# Patient Record
Sex: MALE | Race: WHITE | HISPANIC OR LATINO | Employment: FULL TIME | URBAN - METROPOLITAN AREA
[De-identification: names, ages, dates, MRNs, and addresses within clinical notes are randomized per-mention and may not be internally consistent; named-entity substitution may affect disease eponyms.]

---

## 2021-07-28 ENCOUNTER — NON-PROVIDER VISIT (OUTPATIENT)
Dept: URGENT CARE | Facility: PHYSICIAN GROUP | Age: 51
End: 2021-07-28

## 2021-07-28 DIAGNOSIS — Z02.1 PRE-EMPLOYMENT DRUG SCREENING: ICD-10-CM

## 2021-07-28 LAB
AMP AMPHETAMINE: NORMAL
COC COCAINE: NORMAL
INT CON NEG: NEGATIVE
INT CON POS: POSITIVE
MET METHAMPHETAMINES: NORMAL
OPI OPIATES: NORMAL
PCP PHENCYCLIDINE: NORMAL
POC DRUG COMMENT 753798-OCCUPATIONAL HEALTH: NORMAL
THC: NORMAL

## 2021-07-28 PROCEDURE — 80305 DRUG TEST PRSMV DIR OPT OBS: CPT | Performed by: PHYSICIAN ASSISTANT

## 2022-02-02 ENCOUNTER — HOSPITAL ENCOUNTER (EMERGENCY)
Facility: MEDICAL CENTER | Age: 52
End: 2022-02-02
Attending: EMERGENCY MEDICINE
Payer: COMMERCIAL

## 2022-02-02 VITALS
TEMPERATURE: 98.6 F | OXYGEN SATURATION: 98 % | SYSTOLIC BLOOD PRESSURE: 119 MMHG | BODY MASS INDEX: 28.18 KG/M2 | RESPIRATION RATE: 16 BRPM | HEIGHT: 69 IN | HEART RATE: 98 BPM | DIASTOLIC BLOOD PRESSURE: 82 MMHG | WEIGHT: 190.26 LBS

## 2022-02-02 DIAGNOSIS — U07.1 COVID: ICD-10-CM

## 2022-02-02 LAB
FLUAV RNA SPEC QL NAA+PROBE: NEGATIVE
FLUBV RNA SPEC QL NAA+PROBE: NEGATIVE
RSV RNA SPEC QL NAA+PROBE: NEGATIVE
S PYO DNA SPEC NAA+PROBE: NOT DETECTED
SARS-COV-2 RNA RESP QL NAA+PROBE: DETECTED
SPECIMEN SOURCE: ABNORMAL

## 2022-02-02 PROCEDURE — C9803 HOPD COVID-19 SPEC COLLECT: HCPCS | Performed by: EMERGENCY MEDICINE

## 2022-02-02 PROCEDURE — 99283 EMERGENCY DEPT VISIT LOW MDM: CPT

## 2022-02-02 PROCEDURE — 87651 STREP A DNA AMP PROBE: CPT

## 2022-02-02 PROCEDURE — 0241U HCHG SARS-COV-2 COVID-19 NFCT DS RESP RNA 4 TRGT MIC: CPT

## 2022-02-02 NOTE — ED PROVIDER NOTES
"ED Provider Note    Scribed for Gilbert Graham M.D. by Loren Del Rosario. 2/2/2022, 8:50 AM.    Primary care provider: Pcp Pt States None  Means of arrival: Walk-In  History obtained from: Patient  History limited by: None    CHIEF COMPLAINT  Chief Complaint   Patient presents with    Sore Throat    Generalized Body Aches     HPI  Deo Medrano is a 51 y.o. male who presents to the Emergency Department with presumed Covid-19 symptoms onset Sunday. He arrives with associated nausea, Trouble swallowing secondary to Sore throat, worsening headache, and generalized body aches. He states that this morning he \"woke up feeling really weird\". He notes that many people at his work place are sick. He is vaccinated for Covid-19 with the J&J vaccine.     REVIEW OF SYSTEMS  See HPI for further details    PAST MEDICAL HISTORY   Not pertinent to patient's HPI.     SURGICAL HISTORY   has a past surgical history that includes cholecystectomy.    SOCIAL HISTORY  Social History     Tobacco Use    Smoking status: Never Smoker    Smokeless tobacco: Not noted   Substance Use Topics    Alcohol use: Not Currently    Drug use: Not Currently      Social History     Substance and Sexual Activity   Drug Use Not Currently       FAMILY HISTORY  Son passed away from Covid-19    CURRENT MEDICATIONS  Home Medications       Reviewed by Marilyn Kwan R.N. (Registered Nurse) on 02/02/22 at 0757  Med List Status: Not Addressed     Medication Last Dose Status        Patient Chilo Taking any Medications                           ALLERGIES  No Known Allergies    PHYSICAL EXAM  VITAL SIGNS: /97   Pulse (!) 111   Temp 36.7 °C (98.1 °F) (Temporal)   Resp 18   Ht 1.753 m (5' 9\")   Wt 86.3 kg (190 lb 4.1 oz)   SpO2 96%   BMI 28.10 kg/m²     Constitutional: Well developed, Well nourished, No acute distress, Non-toxic appearance.   HENT: Normocephalic, Atraumatic, Bilateral external ears normal, oropharynx moist, No oral exudates, Nose normal. "   Eyes:conjunctiva is normal, there are no signs of exudate.   Neck: Supple, no meningeal signs.  Lymphatic: No lymphadenopathy noted.   Cardiovascular: Regular rate and rhythm without murmurs gallops or rubs.   Thorax & Lungs: No respiratory distress. Breathing comfortably. Lungs are clear to auscultation bilaterally, there are no wheezes no rales. Chest wall is nontender.  Abdomen: Soft, nontender, nondistended. Bowel sounds are present.   Skin: Warm, Dry, No erythema,   Back: No tenderness, No CVA tenderness.    LABS  Results for orders placed or performed during the hospital encounter of 02/02/22   COV-2, FLU A/B, AND RSV BY PCR (2-4 HOURS CEPHEID): Collect NP swab in VTM    Specimen: Respirate   Result Value Ref Range    Influenza virus A RNA Negative Negative    Influenza virus B, PCR Negative Negative    RSV, PCR Negative Negative    SARS-CoV-2 by PCR DETECTED (AA)     SARS-CoV-2 Source NP Swab    Group A Strep by PCR    Specimen: Throat; Respirate   Result Value Ref Range    Group A Strep by PCR Not Detected Not Detected   All labs reviewed by me.    COURSE & MEDICAL DECISION MAKING  Pertinent Labs & Imaging studies reviewed. (See chart for details)    8:50 AM - Patient seen and examined at bedside. Ordered Group A strep and COV-2, FLU A/B, AND RSV BY PCR (2-4 HOURS CEPHEID): Collect NP swab in VTM to evaluate his symptoms. Informed the patient that we would swab him for Covid, the Flu and Strep. Discussed possibility of Regeneron treatment if he tests positive for the Covid-19 vaccine. Patient verbalizes understanding and agreement to this plan of care.     The differential diagnoses include but are not limited to: Covid, Flu, Strep throat     10:58 AM- Patient found to be Covid-positive on lab results. Consulted with pharmacy on further pharmaceutical treatment options. [facility]     Decision Making:  Patient presents for evaluation.  Clinically the patient is having  symptoms consistent with COVID strep was negative Covid was positive the patient does meet criteria for Paxlovid.  We did give the patient the EUA for Paxlovid he would like to have it so I will give him the prescription.  The patient will treat himself symptomatically Tylenol ibuprofen as needed continue pushing fluids and will follow the primary care physician as needed.    The patient will return for new or worsening symptoms and is stable at the time of discharge.    The patient is referred to a primary physician for blood pressure management, diabetic screening, and for all other preventative health concerns.    DISPOSITION:  Patient will be discharged home in stable condition.    FOLLOW UP:  [facility] ([facility]) - Primary Care  [address] [facility] [address] [address]  [phone]        [facility] - Behavioral Health Counseling  [address] [facility] [address]  [phone]        OUTPATIENT MEDICATIONS:  Discharge Medication List as of 2/2/2022 11:15 AM        START taking these medications    Details   Nirmatrelvir & Ritonavir 20 x 150 MG & 10 x 100MG Tablet Therapy Pack Take 300 mg nirmatrelvir (two 150 mg tablets) with 100 mg  ritonavir (one 100 mg tablet) by mouth, with all three tablets taken together  twice daily for 5 days., Disp-30 Each, R-0, Print Rx Paper            FINAL IMPRESSION  1. Loren HOUGH (Von), am scribing for, and in the presence of, Gilbert Graham M.D..    Electronically signed by: Loren Henry), 2/2/2022    Gilbert JIMENEZ M.D. personally performed the services described in this documentation, as scribed by Loren Del Rosario in my presence, and it is both accurate and complete.    The note accurately reflects work and decisions made by me.  Gilbert Graham M.D.  2/2/2022  2:59 PM

## 2022-02-02 NOTE — ED TRIAGE NOTES
"Chief Complaint   Patient presents with   • Sore Throat   • Generalized Body Aches     Pt to ED from home c/o sore throat and body aches x2 days   pt reports positive COVID contacts. VSS NAD    /97   Pulse (!) 111   Temp 36.7 °C (98.1 °F) (Temporal)   Resp 18   Ht 1.753 m (5' 9\")   Wt 86.3 kg (190 lb 4.1 oz)   SpO2 96%   BMI 28.10 kg/m²     "

## 2022-04-20 ENCOUNTER — OFFICE VISIT (OUTPATIENT)
Dept: URGENT CARE | Facility: PHYSICIAN GROUP | Age: 52
End: 2022-04-20
Payer: COMMERCIAL

## 2022-04-20 VITALS
DIASTOLIC BLOOD PRESSURE: 82 MMHG | RESPIRATION RATE: 15 BRPM | HEART RATE: 104 BPM | SYSTOLIC BLOOD PRESSURE: 158 MMHG | OXYGEN SATURATION: 98 % | TEMPERATURE: 97.9 F | HEIGHT: 69 IN | WEIGHT: 187 LBS | BODY MASS INDEX: 27.7 KG/M2

## 2022-04-20 DIAGNOSIS — J01.00 ACUTE NON-RECURRENT MAXILLARY SINUSITIS: ICD-10-CM

## 2022-04-20 PROCEDURE — 99204 OFFICE O/P NEW MOD 45 MIN: CPT | Performed by: NURSE PRACTITIONER

## 2022-04-20 RX ORDER — AMOXICILLIN AND CLAVULANATE POTASSIUM 875; 125 MG/1; MG/1
1 TABLET, FILM COATED ORAL 2 TIMES DAILY
Qty: 14 TABLET | Refills: 0 | Status: SHIPPED | OUTPATIENT
Start: 2022-04-20 | End: 2022-04-27

## 2022-04-20 RX ORDER — FLUTICASONE PROPIONATE 50 MCG
1 SPRAY, SUSPENSION (ML) NASAL 2 TIMES DAILY
Qty: 16 G | Refills: 0 | Status: SHIPPED | OUTPATIENT
Start: 2022-04-20 | End: 2022-07-01

## 2022-04-20 NOTE — PROGRESS NOTES
Chief Complaint   Patient presents with   • Sore Throat   • Cough   • Runny Nose       HISTORY OF PRESENT ILLNESS: Patient is a pleasant 51 y.o. male who presents today due to two weeks of nasal congestion, cough, fever, chills, headache, and sinus pressure. He denies associated difficulty breathing, confusion, nausea, vomiting or diarrhea. He has tried OTC cold/sinus medication at home without much improvement. No known ill contacts at home. No recent antibiotic usage.       There are no problems to display for this patient.      Allergies:Patient has no known allergies.    Current Outpatient Medications Ordered in Epic   Medication Sig Dispense Refill   • amoxicillin-clavulanate (AUGMENTIN) 875-125 MG Tab Take 1 Tablet by mouth 2 times a day for 7 days. 14 Tablet 0   • fluticasone (FLONASE) 50 MCG/ACT nasal spray Administer 1 Spray into affected nostril(S) 2 times a day. 16 g 0   • Nirmatrelvir & Ritonavir 20 x 150 MG & 10 x 100MG Tablet Therapy Pack Take 300 mg nirmatrelvir (two 150 mg tablets) with 100 mg  ritonavir (one 100 mg tablet) by mouth, with all three tablets taken together  twice daily for 5 days. (Patient not taking: Reported on 4/20/2022) 30 Each 0     No current [facility]-ordered facility-administered medications on file.       History reviewed. No pertinent past medical history.    Social History     Tobacco Use   • Smoking status: Never Smoker   Substance Use Topics   • Alcohol use: Not Currently   • Drug use: Not Currently       No family status information on file.   History reviewed. No pertinent family history.    ROS:  Review of Systems   Constitutional: Positive for fever, chills, fatigue. Negative for weight loss.   HENT: Positive for sinus pressure, sore throat, nasal congestion. Negative for ear pain, nosebleeds, neck pain.    Eyes: Negative for vision changes.   Neuro: Positive for headache. Negative for sensory changes, weakness, seizure, LOC.  Cardiovascular: Negative for chest pain,  "palpitations, orthopnea and leg swelling.   Respiratory: Positive for cough. Negative for shortness of breath and wheezing.   Gastrointestinal: Negative for abdominal pain, nausea, vomiting or diarrhea.    Skin: Negative for rash, diaphoresis.     Exam:  /82 (BP Location: Left arm, Patient Position: Sitting, BP Cuff Size: Adult)   Pulse (!) 104   Temp 36.6 °C (97.9 °F) (Temporal)   Resp 15   Ht 1.753 m (5' 9\")   Wt 84.8 kg (187 lb)   SpO2 98%   General: well-nourished, well-developed male in NAD  Head: normocephalic, atraumatic  Eyes: PERRLA, no conjunctival injection, acuity grossly intact, lids normal.  Ears: normal shape and symmetry, no tenderness, no discharge. External canals are without any significant edema or erythema. Tympanic membranes are without any inflammation, + bilateral scant effusion. Gross auditory acuity is intact.  Nose: symmetrical without tenderness, erythema and swelling noted bilateral turbinates, clear discharge. Maxillary sinus tenderness.   Mouth/Throat: reasonable hygiene, no exudates or tonsillar enlargement. Erythema is present.   Neck: no masses, range of motion within normal limits, no tracheal deviation. No obvious thyroid enlargement.   Lymph: no cervical adenopathy. No supraclavicular adenopathy.   Neuro: alert and oriented. Cranial nerves 1-12 grossly intact. No sensory deficit.   Cardiovascular: regular rate and rhythm. No edema.  Pulmonary: no distress. Chest is symmetrical with respiration, no wheezes, crackles, or rhonchi.   Musculoskeletal: no clubbing, appropriate muscle tone, gait is stable.  Skin: warm, dry, intact, no clubbing, no cyanosis, no rashes.   Psych: appropriate mood, affect, judgement.         Assessment/Plan:  1. Acute non-recurrent maxillary sinusitis  amoxicillin-clavulanate (AUGMENTIN) 875-125 MG Tab    fluticasone (FLONASE) 50 MCG/ACT nasal spray         Antibiotic as directed, potential side effects of medication discussed. Probiotic use " encouraged. Flonase as directed.   Sleep with HOB elevated, humidifier at night, rest, increase fluid intake.   Supportive care, differential diagnoses, and indications for immediate follow-up discussed with patient.   Pathogenesis of diagnosis discussed including typical length and natural progression.   Instructed to return to clinic or nearest emergency department for any change in condition, further concerns, or worsening of symptoms.  Patient states understanding of the plan of care and discharge instructions.  Instructed to make an appointment, for follow up, with his primary care provider.        Please note that this dictation was created using voice recognition software. I have made every reasonable attempt to correct obvious errors, but I expect that there are errors of grammar and possibly content that I did not discover before finalizing the note. N95 and safety glasses used for entire visit.       DEBI Sanchez.

## 2022-05-19 ENCOUNTER — APPOINTMENT (OUTPATIENT)
Dept: RADIOLOGY | Facility: IMAGING CENTER | Age: 52
End: 2022-05-19
Attending: PHYSICIAN ASSISTANT
Payer: COMMERCIAL

## 2022-05-19 ENCOUNTER — OFFICE VISIT (OUTPATIENT)
Dept: URGENT CARE | Facility: CLINIC | Age: 52
End: 2022-05-19
Payer: COMMERCIAL

## 2022-05-19 VITALS
WEIGHT: 183 LBS | DIASTOLIC BLOOD PRESSURE: 80 MMHG | SYSTOLIC BLOOD PRESSURE: 132 MMHG | HEART RATE: 98 BPM | TEMPERATURE: 97.9 F | RESPIRATION RATE: 20 BRPM | OXYGEN SATURATION: 97 % | BODY MASS INDEX: 27.11 KG/M2 | HEIGHT: 69 IN

## 2022-05-19 DIAGNOSIS — M89.8X6 PAIN IN LEFT TIBIA: ICD-10-CM

## 2022-05-19 DIAGNOSIS — M89.8X6 PAIN IN LEFT TIBIA: Primary | ICD-10-CM

## 2022-05-19 PROCEDURE — 99213 OFFICE O/P EST LOW 20 MIN: CPT | Performed by: PHYSICIAN ASSISTANT

## 2022-05-19 PROCEDURE — 73590 X-RAY EXAM OF LOWER LEG: CPT | Mod: TC,LT | Performed by: RADIOLOGY

## 2022-05-20 NOTE — PROGRESS NOTES
"Subjective:   Deo Medrano is a 51 y.o. male who presents for Leg Injury (L shin injury 3 weeks ago)     Patient presents with chief complaint of left tibial pain.  He reports he hit his shin on a ledge about 3 weeks ago.  He did not feel pain.  He decided to continue walking on it and working on it.  The pain is progressed.  He has noted swelling at the end of his work days.  He denies any knee or ankle pain.      Medications:  fluticasone  Nirmatrelvir & Ritonavir Tbpk    Allergies:             Patient has no known allergies.    Surgical History:         Past Surgical History:   Procedure Laterality Date   • CHOLECYSTECTOMY         Past Social Hx:  Deo Medrano  reports that he has quit smoking. He has never used smokeless tobacco. He reports previous alcohol use. He reports previous drug use.     Past Family Hx:   Deo Medrano family history is not on file.       Problem list, medications, and allergies reviewed by myself today in Epic.     Objective:     /80 (BP Location: Left arm, Patient Position: Sitting, BP Cuff Size: Adult)   Pulse 98   Temp 36.6 °C (97.9 °F) (Temporal)   Resp 20   Ht 1.753 m (5' 9\")   Wt 83 kg (183 lb)   SpO2 97%   BMI 27.02 kg/m²     Physical Exam  Musculoskeletal:        Legs:       Comments: There is tenderness over the anterior medial mid tibia to palpation.  There is a palpable step-off.  There is no obvious ecchymosis.  There is mild swelling.  There is no calf tenderness.  There is no tenderness over the medial malleolus.  There is no tenderness over the tibial tuberosity.  Knee and ankle range of motion are full.  Gait is slightly antalgic.  Distal pulses are intact.         RADIOLOGY RESULTS   DX-TIBIA AND FIBULA LEFT    Result Date: 5/19/2022 5/19/2022 7:18 PM HISTORY/REASON FOR EXAM:  Pain/Deformity Following Trauma; left anteromedial tibial pain, injury 3 weeks ago.. Left leg pain, recent injury TECHNIQUE/EXAM DESCRIPTION AND NUMBER OF VIEWS:  2 views of the LEFT tibia " and fibula. COMPARISON: None FINDINGS: There is no fracture. Alignment is normal. No degenerative changes are present.     Negative left tibial/fibular series         *X-rays were reviewed and interpreted independently by me. I agree with the radiologist's findings     Assessment/Plan:     Diagnosis and Associated Orders:     1. Pain in left tibia  - DX-TIBIA AND FIBULA LEFT; Future        Comments/MDM:    Normal x-ray.  Recommend ice, rest, compression.  Did offer Ace bandage.  May also purchase compression socks for prolonged upright position.  Tylenol/ibuprofen as needed for pain.  Follow-up with primary care provider if symptoms persist.    I personally reviewed prior external notes and test results pertinent to today's visit.  Red flags discussed as well as indications to present to the Emergency Department.  Supportive care, natural history, differential diagnoses, and indications for immediate follow-up discussed.  Patient expresses understanding and agrees to plan.  Patient denies any other questions or concerns.    Follow-up with the primary care physician for recheck, reevaluation, and consideration of further management.      Please note that this dictation was created using voice recognition software. I have made a reasonable attempt to correct obvious errors, but I expect that there are errors of grammar and possibly content that I did not discover before finalizing the note.    This note was electronically signed by Marilyn Joyce PA-C

## 2022-07-01 ENCOUNTER — OFFICE VISIT (OUTPATIENT)
Dept: URGENT CARE | Facility: CLINIC | Age: 52
End: 2022-07-01
Payer: COMMERCIAL

## 2022-07-01 ENCOUNTER — HOSPITAL ENCOUNTER (OUTPATIENT)
Facility: MEDICAL CENTER | Age: 52
End: 2022-07-01
Attending: PHYSICIAN ASSISTANT
Payer: COMMERCIAL

## 2022-07-01 VITALS
BODY MASS INDEX: 26.57 KG/M2 | OXYGEN SATURATION: 96 % | DIASTOLIC BLOOD PRESSURE: 70 MMHG | HEART RATE: 99 BPM | WEIGHT: 179.4 LBS | RESPIRATION RATE: 16 BRPM | TEMPERATURE: 97.5 F | HEIGHT: 69 IN | SYSTOLIC BLOOD PRESSURE: 102 MMHG

## 2022-07-01 DIAGNOSIS — R68.89 FLU-LIKE SYMPTOMS: ICD-10-CM

## 2022-07-01 PROCEDURE — 99213 OFFICE O/P EST LOW 20 MIN: CPT | Performed by: PHYSICIAN ASSISTANT

## 2022-07-01 PROCEDURE — 0240U HCHG SARS-COV-2 COVID-19 NFCT DS RESP RNA 3 TRGT MIC: CPT

## 2022-07-01 ASSESSMENT — ENCOUNTER SYMPTOMS
HEADACHES: 1
FEVER: 1
COUGH: 1

## 2022-07-01 NOTE — PROGRESS NOTES
"  Subjective:   Deo Medrano is a 51 y.o. male who presents today with   Chief Complaint   Patient presents with   • Headache     Cough, runny nose, fever x 1 day      Cough  This is a new problem. The current episode started yesterday. The problem has been unchanged. Associated symptoms include a fever and headaches. Treatments tried: ibuprofen. The treatment provided mild relief.     PMH:  has no past medical history on file.  MEDS:   Current Outpatient Medications:   •  fluticasone (FLONASE) 50 MCG/ACT nasal spray, Administer 1 Spray into affected nostril(S) 2 times a day. (Patient not taking: No sig reported), Disp: 16 g, Rfl: 0  •  Nirmatrelvir & Ritonavir 20 x 150 MG & 10 x 100MG Tablet Therapy Pack, Take 300 mg nirmatrelvir (two 150 mg tablets) with 100 mg ritonavir (one 100 mg tablet) by mouth, with all three tablets taken together twice daily for 5 days. (Patient not taking: No sig reported), Disp: 30 Each, Rfl: 0  ALLERGIES: No Known Allergies  SURGHX:   Past Surgical History:   Procedure Laterality Date   • CHOLECYSTECTOMY       SOCHX:  reports that he has quit smoking. He has never used smokeless tobacco. He reports previous alcohol use. He reports previous drug use.  FH: Reviewed with patient, not pertinent to this visit.     Review of Systems   Constitutional: Positive for fever.   HENT:        Runny nose   Respiratory: Positive for cough.    Neurological: Positive for headaches.      Objective:   /70 (BP Location: Left arm, Patient Position: Sitting, BP Cuff Size: Adult)   Pulse 99   Temp 36.4 °C (97.5 °F) (Temporal)   Resp 16   Ht 1.753 m (5' 9\")   Wt 81.4 kg (179 lb 6.4 oz)   SpO2 96%   BMI 26.49 kg/m²   Physical Exam  Vitals and nursing note reviewed.   Constitutional:       General: He is not in acute distress.     Appearance: Normal appearance. He is well-developed. He is not ill-appearing or toxic-appearing.   HENT:      Head: Normocephalic and atraumatic.      Right Ear: Hearing " normal.      Left Ear: Hearing normal.   Cardiovascular:      Rate and Rhythm: Normal rate and regular rhythm.      Heart sounds: Normal heart sounds.   Pulmonary:      Effort: Pulmonary effort is normal.      Breath sounds: Normal breath sounds. No stridor. No wheezing, rhonchi or rales.   Musculoskeletal:      Comments: Normal movement in all 4 extremities   Skin:     General: Skin is warm and dry.   Neurological:      Mental Status: He is alert.      Coordination: Coordination normal.   Psychiatric:         Mood and Affect: Mood normal.       Assessment/Plan:   Assessment    1. Flu-like symptoms  - CoV-2 and Flu A/B by PCR (24 hour In-House): Collect NP swab in [facility]; Future  Symptoms and presentation consistent with viral illness and we will rule out COVID at this time.  Vital signs are stable on exam today.  Discussed CDC guidelines including self isolation at home.   Patient encouraged to get plenty of rest, use OTC tylenol for pain/fever, and drink plenty of fluids.    Patient does not meet criteria for antiviral therapy if he does test positive for COVID.  Differential diagnosis, natural history, supportive care, and indications for immediate follow-up discussed.   Patient given instructions and understanding of medications and treatment.    If not improving in 3-5 days, F/U with PCP or return to  if symptoms worsen.    Patient agreeable to plan.      Please note that this dictation was created using voice recognition software. I have made every reasonable attempt to correct obvious errors, but I expect that there are errors of grammar and possibly content that I did not discover before finalizing the note.    Jose Geiger PA-C

## 2022-07-02 LAB
FLUAV RNA SPEC QL NAA+PROBE: NEGATIVE
FLUBV RNA SPEC QL NAA+PROBE: NEGATIVE
SARS-COV-2 RNA RESP QL NAA+PROBE: DETECTED
SPECIMEN SOURCE: ABNORMAL

## 2022-11-22 ENCOUNTER — OFFICE VISIT (OUTPATIENT)
Dept: URGENT CARE | Facility: PHYSICIAN GROUP | Age: 52
End: 2022-11-22
Payer: COMMERCIAL

## 2022-11-22 VITALS
RESPIRATION RATE: 18 BRPM | OXYGEN SATURATION: 97 % | SYSTOLIC BLOOD PRESSURE: 118 MMHG | DIASTOLIC BLOOD PRESSURE: 76 MMHG | HEART RATE: 88 BPM | BODY MASS INDEX: 24.73 KG/M2 | HEIGHT: 69 IN | TEMPERATURE: 99 F | WEIGHT: 167 LBS

## 2022-11-22 DIAGNOSIS — J01.10 ACUTE NON-RECURRENT FRONTAL SINUSITIS: ICD-10-CM

## 2022-11-22 DIAGNOSIS — R05.1 ACUTE COUGH: ICD-10-CM

## 2022-11-22 DIAGNOSIS — R09.81 COMPLAINT OF NASAL CONGESTION: ICD-10-CM

## 2022-11-22 LAB
EXTERNAL QUALITY CONTROL: NORMAL
INT CON NEG: NORMAL
INT CON POS: NORMAL
SARS-COV+SARS-COV-2 AG RESP QL IA.RAPID: NEGATIVE

## 2022-11-22 PROCEDURE — 99213 OFFICE O/P EST LOW 20 MIN: CPT | Performed by: PHYSICIAN ASSISTANT

## 2022-11-22 PROCEDURE — 87426 SARSCOV CORONAVIRUS AG IA: CPT | Performed by: PHYSICIAN ASSISTANT

## 2022-11-22 RX ORDER — FLUTICASONE PROPIONATE 50 MCG
1 SPRAY, SUSPENSION (ML) NASAL DAILY
Qty: 16 G | Refills: 0 | Status: SHIPPED | OUTPATIENT
Start: 2022-11-22

## 2022-11-22 RX ORDER — AMOXICILLIN AND CLAVULANATE POTASSIUM 875; 125 MG/1; MG/1
1 TABLET, FILM COATED ORAL 2 TIMES DAILY
Qty: 10 TABLET | Refills: 0 | Status: SHIPPED | OUTPATIENT
Start: 2022-11-22 | End: 2022-11-27

## 2022-11-22 ASSESSMENT — ENCOUNTER SYMPTOMS: COUGH: 1

## 2022-11-22 NOTE — LETTER
November 22, 2022    To Whom It May Concern:         This is confirmation that Deo Medrano attended his scheduled appointment with Marilyn Joyce P.A.-C. on 11/22/22.  Please excuse patient from work today.  Covid testing negative in clinic today.         If you have any questions please do not hesitate to call me at the phone number listed below.    Sincerely,          Marilyn Joyce P.A.-C.  [phone]

## 2022-11-22 NOTE — PROGRESS NOTES
"Subjective:   Deo Medrano is a 52 y.o. male who presents for Cough (Ears full, ear pain)     Myalgias, nasal/ear congestion, h/o sinus infections.  Symptoms progressing.  Ongoing over 6 days.  Low grade fever.  Feels like breathing out of one nostril.  No SOB.  No underlying respiratory illness.  Has tried nyquil.    Requests covid testing for work.  Vaccinated           Review of Systems   Respiratory:  Positive for cough.      Medications:  This patient does not have an active medication from one of the medication groupers.    Allergies:             Patient has no known allergies.    Surgical History:         Past Surgical History:   Procedure Laterality Date    CHOLECYSTECTOMY         Past Social Hx:  Deo Medrano  reports that he has been smoking cigarettes. He has been smoking an average of .5 packs per day. He has never used smokeless tobacco. He reports that he does not currently use alcohol. He reports current drug use. Drug: Marijuana.     Past Family Hx:   Deo Medrano family history is not on file.       Problem list, medications, and allergies reviewed by myself today in Epic.     Objective:     /76   Pulse 88   Temp 37.2 °C (99 °F)   Resp 18   Ht 1.753 m (5' 9\")   Wt 75.8 kg (167 lb)   SpO2 97%   BMI 24.66 kg/m²     Physical Exam  Vitals reviewed.   Constitutional:       General: He is not in acute distress.     Appearance: He is well-developed. He is not ill-appearing, toxic-appearing or diaphoretic.   HENT:      Head: Normocephalic.      Right Ear: Ear canal and external ear normal. Tympanic membrane is injected.      Left Ear: Ear canal and external ear normal. Tympanic membrane is injected.      Nose: Mucosal edema and rhinorrhea present.      Right Sinus: Maxillary sinus tenderness and frontal sinus tenderness present.      Left Sinus: Maxillary sinus tenderness and frontal sinus tenderness present.      Mouth/Throat:      Mouth: Mucous membranes are dry.      Pharynx: Uvula midline. " Posterior oropharyngeal erythema present. No uvula swelling.   Eyes:      Conjunctiva/sclera: Conjunctivae normal.      Pupils: Pupils are equal, round, and reactive to light.   Cardiovascular:      Rate and Rhythm: Normal rate and regular rhythm.   Pulmonary:      Effort: Pulmonary effort is normal. No accessory muscle usage or respiratory distress.      Breath sounds: Normal breath sounds. No decreased breath sounds, wheezing, rhonchi or rales.   Musculoskeletal:         General: Normal range of motion.      Cervical back: Normal range of motion and neck supple.   Lymphadenopathy:      Cervical: No cervical adenopathy.   Skin:     General: Skin is warm and dry.   Neurological:      Mental Status: He is alert and oriented to person, place, and time.   Psychiatric:         Behavior: Behavior is cooperative.     Rapid COVID-negative  Assessment/Plan:     Diagnosis and Associated Orders:     1. Complaint of nasal congestion  - POCT SARS-COV Antigen COURT Manual Result  - fluticasone (FLONASE) 50 MCG/ACT nasal spray; Administer 1 Spray into affected nostril(S) every day.  Dispense: 16 g; Refill: 0    2. Acute non-recurrent frontal sinusitis  - amoxicillin-clavulanate (AUGMENTIN) 875-125 MG Tab; Take 1 Tablet by mouth 2 times a day for 5 days.  Dispense: 10 Tablet; Refill: 0    3. Acute cough      Comments/MDM:  Rapid COVID-negative.  Work note provided.  Symptoms consistent with prior viral sinusitis, now presenting with bacterial symptomatology.  Contingent antibiotic prescription provided.  Recommend the following for conservative treatment.    -Increase water intake  -May use over the counter Ibuprofen/Tylenol as needed for any fever, body aches or throat pain  -May take long acting antihistamine for seasonal allergy symptoms and post-nasal drip as needed  -Over the counter cough suppressant as directed.  -May use over the counter saline nasal spray for nasal lavage for nasal congestion as needed  -May use over the  counter Nasacort/Flonase for nasal congestion as needed   -May use throat lozenges for throat discomfort as needed   -May gargle with salt water up to 4x/day as needed for throat discomfort (1 tsp salt dissolved in 1 cup warm water)  -Monitor for increased sinus pain/pressure with sinus congestion with thick mucus production, sinus headache, cough, shortness of breath, fever- need re-evaluation      I personally reviewed prior external notes and test results pertinent to today's visit.  Red flags discussed as well as indications to present to the Emergency Department.  Supportive care, natural history, differential diagnoses, and indications for immediate follow-up discussed.  Patient expresses understanding and agrees to plan.  Patient denies any other questions or concerns.    Follow-up with the primary care physician for recheck, reevaluation, and consideration of further management.      Please note that this dictation was created using voice recognition software. I have made a reasonable attempt to correct obvious errors, but I expect that there are errors of grammar and possibly content that I did not discover before finalizing the note.    This note was electronically signed by Marilyn Joyce PA-C

## 2023-10-17 ENCOUNTER — NON-PROVIDER VISIT (OUTPATIENT)
Dept: URGENT CARE | Facility: CLINIC | Age: 53
End: 2023-10-17

## 2023-10-17 DIAGNOSIS — Z02.1 PRE-EMPLOYMENT DRUG SCREENING: ICD-10-CM

## 2023-10-17 LAB
AMP AMPHETAMINE: NEGATIVE
COC COCAINE: NEGATIVE
INT CON NEG: NORMAL
INT CON POS: NORMAL
MET METHAMPHETAMINES: NEGATIVE
OPI OPIATES: NEGATIVE
PCP PHENCYCLIDINE: NEGATIVE
POC DRUG COMMENT 753798-OCCUPATIONAL HEALTH: NORMAL
THC: POSITIVE

## 2023-10-17 PROCEDURE — 80305 DRUG TEST PRSMV DIR OPT OBS: CPT | Performed by: NURSE PRACTITIONER

## 2023-10-24 ENCOUNTER — EH NON-PROVIDER (OUTPATIENT)
Dept: OCCUPATIONAL MEDICINE | Facility: CLINIC | Age: 53
End: 2023-10-24

## 2023-10-24 DIAGNOSIS — Z02.83 ENCOUNTER FOR DRUG SCREENING: ICD-10-CM

## 2023-10-24 PROCEDURE — 8911 PR MRO FEE: Performed by: NURSE PRACTITIONER

## 2025-07-22 ENCOUNTER — OFFICE VISIT (OUTPATIENT)
Dept: URGENT CARE | Facility: PHYSICIAN GROUP | Age: 55
End: 2025-07-22
Payer: COMMERCIAL

## 2025-07-22 VITALS — TEMPERATURE: 97.3 F | BODY MASS INDEX: 24.22 KG/M2 | WEIGHT: 164 LBS | HEART RATE: 79 BPM

## 2025-07-22 DIAGNOSIS — K08.89 DENTALGIA: ICD-10-CM

## 2025-07-22 DIAGNOSIS — K04.7 DENTAL INFECTION: Primary | ICD-10-CM

## 2025-07-22 DIAGNOSIS — L70.0 CYSTIC ACNE: ICD-10-CM

## 2025-07-22 PROCEDURE — 99214 OFFICE O/P EST MOD 30 MIN: CPT

## 2025-07-22 RX ORDER — ACETAMINOPHEN 500 MG
500-1000 TABLET ORAL EVERY 8 HOURS PRN
Qty: 30 TABLET | Refills: 0 | Status: SHIPPED | OUTPATIENT
Start: 2025-07-22

## 2025-07-22 RX ORDER — IBUPROFEN 600 MG/1
600 TABLET, FILM COATED ORAL EVERY 8 HOURS PRN
Qty: 30 TABLET | Refills: 0 | Status: SHIPPED | OUTPATIENT
Start: 2025-07-22

## 2025-07-22 RX ORDER — MUPIROCIN 2 %
1 OINTMENT (GRAM) TOPICAL 2 TIMES DAILY
Qty: 22 G | Refills: 0 | Status: SHIPPED | OUTPATIENT
Start: 2025-07-22

## 2025-07-22 RX ORDER — AMOXICILLIN 500 MG/1
500 CAPSULE ORAL 3 TIMES DAILY
Qty: 21 CAPSULE | Refills: 0 | Status: SHIPPED | OUTPATIENT
Start: 2025-07-22 | End: 2025-07-29

## 2025-07-22 RX ORDER — CHLORHEXIDINE GLUCONATE ORAL RINSE 1.2 MG/ML
15 SOLUTION DENTAL 2 TIMES DAILY
Qty: 118 ML | Refills: 0 | Status: SHIPPED | OUTPATIENT
Start: 2025-07-22

## 2025-07-22 ASSESSMENT — ENCOUNTER SYMPTOMS
FEVER: 0
SORE THROAT: 0
FACIAL SWELLING: 1
CHILLS: 0
SINUS PAIN: 1
STRIDOR: 0

## 2025-07-22 NOTE — PROGRESS NOTES
"  Subjective:   CHIEF COMPLAINT  Chief Complaint   Patient presents with    Facial Swelling     R side, cheekbone area, painful, x3d          Deo Medrano is a very pleasant 54 y.o. male who presents for Facial Swelling (R side, cheekbone area, painful, x3d )      Patient presents with complaints of possible \"abscess\" on his right maxilla and dental pain.  States he initially thought the swelling was from a dental infection as he has these chronically and is in need of having full removal and denture placement.  He recently had a tooth removed that was infected.  He denies any fever chills body aches.  States that the area on his maxilla is warm and inflamed.  No difficulty breathing, no throat swelling, no stridor.  He has not tried any treatments at this time    Facial Swelling  Pertinent negatives include no chills, congestion, fever or sore throat.       Review of Systems   Constitutional:  Negative for chills, fever and malaise/fatigue.   HENT:  Positive for facial swelling and sinus pain. Negative for congestion and sore throat.         Dental pain   Respiratory:  Negative for stridor.    Skin:         Lesion right maxilla     Refer to HPI for additional details.    During this visit, appropriate PPE was worn, and hand hygiene was performed.    PMH:  has no past medical history on file.    MEDS: Current Medications[1]    ALLERGIES: Allergies[2]  SURGHX: Past Surgical History[3]  SOCHX:  reports that he has been smoking cigarettes. He has never used smokeless tobacco. He reports that he does not currently use alcohol. He reports current drug use. Drug: Marijuana.    FH: Per HPI as applicable/pertinent.    Medications, Allergies, and current problem list reviewed today in Epic.     Objective:     Pulse 79   Temp 36.3 °C (97.3 °F) (Temporal)   Wt 74.4 kg (164 lb)     Physical Exam  Constitutional:       General: He is not in acute distress.     Appearance: Normal appearance. He is not ill-appearing.   HENT:      " Head: Normocephalic and atraumatic.        Comments: Small papular/pustular lesion on the above marked area.  No discharge or drainage, there is some slight TOM lesion swelling.  No fluctuance or appreciable abscess     Nose: Nose normal. No congestion or rhinorrhea.      Mouth/Throat:      Mouth: Mucous membranes are moist.      Dentition: Abnormal dentition. Does not have dentures. Dental tenderness, gingival swelling and dental caries present. No dental abscesses or gum lesions.      Pharynx: Oropharynx is clear. No oropharyngeal exudate or posterior oropharyngeal erythema.        Comments: Multiple missing and broken teeth in the above marked area.  There is some gingival swelling no appreciable dental abscess or PTA.  Airway is patent with no stridor  Skin:     Findings: Acne and rash present. Rash is papular and pustular. Rash is not crusting, macular, nodular, purpuric, scaling, urticarial or vesicular.   Neurological:      Mental Status: He is alert.         Assessment/Plan:     Diagnosis and associated orders:     1. Dental infection  - amoxicillin (AMOXIL) 500 MG Cap; Take 1 Capsule by mouth 3 times a day for 7 days.  Dispense: 21 Capsule; Refill: 0  - chlorhexidine (PERIDEX) 0.12 % Solution; Take 15 mL by mouth 2 times a day. Swish and spit  Dispense: 118 mL; Refill: 0    2. Cystic acne  - mupirocin (BACTROBAN) 2 % Ointment; Apply 1 Application topically 2 times a day.  Dispense: 22 g; Refill: 0    3. Dentalgia  - ibuprofen (MOTRIN) 600 MG Tab; Take 1 Tablet by mouth every 8 hours as needed for Mild Pain, Moderate Pain or Inflammation (alternate with acetaminophen).  Dispense: 30 Tablet; Refill: 0  - acetaminophen (TYLENOL) 500 MG Tab; Take 1-2 Tablets by mouth every 8 hours as needed for Mild Pain or Moderate Pain.  Dispense: 30 Tablet; Refill: 0     Comments/MDM:     I discussed HPI and physical exam with patient.  Overall well presenting no red flag symptoms or constitutional complaints of infection.   Physical exam does show some mild swelling in the right maxilla is consistent with cyst/cystic acne.  No overt or appreciable abscess.  Incidental finding of likely infection in multiple teeth, which is a chronic ongoing issue for patient.  He does have a plan in place for addressing his te dental issues with his dentist.  At this point recommended treating empirically for dental infection, will use amoxicillin and chlorhexidine rinse.  Will do topical mupirocin to lesion on maxilla, recommended routine facial cleansing can use OTC benzyl peroxide wash, do not pick or try to pop, Tylenol and ibuprofen for pain and swelling.  Close monitoring of symptoms  Follow up in 3-5 days if no improvement in symptoms           Differential diagnosis, natural history, supportive care, and indications for immediate follow-up discussed.    Advised the patient to follow-up with the primary care physician for recheck, reevaluation, and consideration of further management.    Please note that this dictation was created using voice recognition software. I have made a reasonable attempt to correct obvious errors, but I expect that there are errors of grammar and possibly content that I did not discover before finalizing the note.    This note was electronically signed by TAY Stacy         [1]   Current Outpatient Medications:     mupirocin (BACTROBAN) 2 % Ointment, Apply 1 Application topically 2 times a day., Disp: 22 g, Rfl: 0    amoxicillin (AMOXIL) 500 MG Cap, Take 1 Capsule by mouth 3 times a day for 7 days., Disp: 21 Capsule, Rfl: 0    chlorhexidine (PERIDEX) 0.12 % Solution, Take 15 mL by mouth 2 times a day. Swish and spit, Disp: 118 mL, Rfl: 0    ibuprofen (MOTRIN) 600 MG Tab, Take 1 Tablet by mouth every 8 hours as needed for Mild Pain, Moderate Pain or Inflammation (alternate with acetaminophen)., Disp: 30 Tablet, Rfl: 0    acetaminophen (TYLENOL) 500 MG Tab, Take 1-2 Tablets by mouth every 8 hours as  needed for Mild Pain or Moderate Pain., Disp: 30 Tablet, Rfl: 0    fluticasone (FLONASE) 50 MCG/ACT nasal spray, Administer 1 Spray into affected nostril(S) every day. (Patient not taking: Reported on 7/22/2025), Disp: 16 g, Rfl: 0  [2] No Known Allergies  [3]   Past Surgical History:  Procedure Laterality Date    CHOLECYSTECTOMY